# Patient Record
Sex: FEMALE | Race: WHITE
[De-identification: names, ages, dates, MRNs, and addresses within clinical notes are randomized per-mention and may not be internally consistent; named-entity substitution may affect disease eponyms.]

---

## 2018-06-11 ENCOUNTER — CHARTING TRANS (OUTPATIENT)
Dept: OTHER | Age: 63
End: 2018-06-11

## 2018-11-01 VITALS
BODY MASS INDEX: 21.2 KG/M2 | HEIGHT: 60 IN | TEMPERATURE: 99 F | OXYGEN SATURATION: 98 % | WEIGHT: 108 LBS | RESPIRATION RATE: 16 BRPM | HEART RATE: 72 BPM

## 2020-05-06 ENCOUNTER — HOSPITAL ENCOUNTER (OUTPATIENT)
Dept: CT IMAGING | Facility: HOSPITAL | Age: 65
Discharge: HOME | End: 2020-05-06
Attending: INTERNAL MEDICINE

## 2020-05-06 ENCOUNTER — APPOINTMENT (OUTPATIENT)
Dept: CARDIOLOGY CLINIC | Facility: HOSPITAL | Age: 65
End: 2020-05-06
Attending: INTERNAL MEDICINE

## 2020-05-06 DIAGNOSIS — Z13.9 ENCOUNTER FOR SCREENING: ICD-10-CM

## 2020-05-27 ENCOUNTER — TELEPHONE (OUTPATIENT)
Dept: OTHER | Facility: HOSPITAL | Age: 65
End: 2020-05-27

## 2020-05-27 NOTE — PROGRESS NOTES
Attempted to call Pt regarding cardiac incidental finding of plaque in the thoracic aorta unable to leave a message on preferred number.

## 2020-06-04 ENCOUNTER — TELEPHONE (OUTPATIENT)
Dept: OTHER | Facility: HOSPITAL | Age: 65
End: 2020-06-04

## 2020-06-04 NOTE — PROGRESS NOTES
Pt given follow call for incidental finding of plaque in thoracic aorta and has appoint with cardiology for f/u questions answered

## 2020-06-05 ENCOUNTER — OFFICE VISIT (OUTPATIENT)
Dept: CARDIOLOGY | Age: 65
End: 2020-06-05

## 2020-06-05 VITALS
DIASTOLIC BLOOD PRESSURE: 76 MMHG | BODY MASS INDEX: 21.2 KG/M2 | HEART RATE: 63 BPM | HEIGHT: 60 IN | WEIGHT: 108 LBS | SYSTOLIC BLOOD PRESSURE: 128 MMHG

## 2020-06-05 DIAGNOSIS — I70.0 CALCIFICATION OF AORTA (CMD): Primary | ICD-10-CM

## 2020-06-05 DIAGNOSIS — I35.9 AORTIC VALVE CALCIFICATION: ICD-10-CM

## 2020-06-05 PROCEDURE — 99443 TELEPHONE E&M BY PHYSICIAN EST PT NOT ORIG PREV 7 DAYS 21-30 MIN: CPT | Performed by: INTERNAL MEDICINE

## 2020-06-05 RX ORDER — LISINOPRIL AND HYDROCHLOROTHIAZIDE 12.5; 1 MG/1; MG/1
TABLET ORAL DAILY
COMMUNITY
Start: 2020-04-20

## 2020-06-05 RX ORDER — ROSUVASTATIN CALCIUM 10 MG/1
TABLET, COATED ORAL DAILY
COMMUNITY
Start: 2020-05-26

## 2020-06-05 RX ORDER — CITALOPRAM 20 MG/1
TABLET ORAL DAILY
COMMUNITY

## 2020-06-05 RX ORDER — LEVOTHYROXINE SODIUM 0.05 MG/1
TABLET ORAL DAILY
COMMUNITY
Start: 2019-12-11

## 2020-06-05 RX ORDER — ALPRAZOLAM 0.25 MG/1
0.25 TABLET ORAL PRN
COMMUNITY
Start: 2020-05-22

## 2020-06-05 SDOH — HEALTH STABILITY: PHYSICAL HEALTH: ON AVERAGE, HOW MANY DAYS PER WEEK DO YOU ENGAGE IN MODERATE TO STRENUOUS EXERCISE (LIKE A BRISK WALK)?: 7 DAYS

## 2020-06-05 ASSESSMENT — ENCOUNTER SYMPTOMS
ALLERGIC/IMMUNOLOGIC COMMENTS: NO NEW FOOD ALLERGIES
SUSPICIOUS LESIONS: 0
COUGH: 0
CHILLS: 0
HEMOPTYSIS: 0
WEIGHT GAIN: 0
HEMATOCHEZIA: 0
WEIGHT LOSS: 0
FEVER: 0
BRUISES/BLEEDS EASILY: 0

## 2020-06-05 ASSESSMENT — PATIENT HEALTH QUESTIONNAIRE - PHQ9
2. FEELING DOWN, DEPRESSED OR HOPELESS: NOT AT ALL
SUM OF ALL RESPONSES TO PHQ9 QUESTIONS 1 AND 2: 0
CLINICAL INTERPRETATION OF PHQ9 SCORE: NO FURTHER SCREENING NEEDED
1. LITTLE INTEREST OR PLEASURE IN DOING THINGS: NOT AT ALL
SUM OF ALL RESPONSES TO PHQ9 QUESTIONS 1 AND 2: 0
CLINICAL INTERPRETATION OF PHQ2 SCORE: NO FURTHER SCREENING NEEDED

## 2020-07-06 ENCOUNTER — HOSPITAL ENCOUNTER (OUTPATIENT)
Dept: CARDIOLOGY CLINIC | Facility: HOSPITAL | Age: 65
Discharge: HOME | End: 2020-07-06
Attending: INTERNAL MEDICINE

## 2020-07-06 ENCOUNTER — TELEPHONE (OUTPATIENT)
Dept: CARDIOLOGY | Age: 65
End: 2020-07-06

## 2020-07-06 DIAGNOSIS — R94.39 ABNORMAL CAROTID DUPLEX SCAN: ICD-10-CM

## 2020-07-06 DIAGNOSIS — Z13.9 ENCOUNTER FOR SCREENING: ICD-10-CM

## 2020-07-06 DIAGNOSIS — I70.0 CALCIFICATION OF AORTA (CMD): Primary | ICD-10-CM

## 2021-01-11 ENCOUNTER — HOSPITAL ENCOUNTER (OUTPATIENT)
Dept: CARDIOLOGY CLINIC | Facility: HOSPITAL | Age: 66
Discharge: HOME | End: 2021-01-11
Attending: INTERNAL MEDICINE
Payer: MEDICARE

## 2021-01-11 DIAGNOSIS — I70.0 CALCIFICATION OF AORTA (HCC): ICD-10-CM

## 2021-01-11 PROCEDURE — 93880 EXTRACRANIAL BILAT STUDY: CPT | Performed by: INTERNAL MEDICINE

## 2021-01-12 DIAGNOSIS — I70.0 CALCIFICATION OF AORTA (CMD): ICD-10-CM

## 2021-01-13 ENCOUNTER — TELEPHONE (OUTPATIENT)
Dept: CARDIOLOGY | Age: 66
End: 2021-01-13

## 2021-06-04 ENCOUNTER — APPOINTMENT (OUTPATIENT)
Dept: CARDIOLOGY | Age: 66
End: 2021-06-04

## 2021-06-07 ENCOUNTER — HOSPITAL ENCOUNTER (OUTPATIENT)
Dept: CV DIAGNOSTICS | Facility: HOSPITAL | Age: 66
Discharge: HOME | End: 2021-06-07
Attending: INTERNAL MEDICINE
Payer: MEDICARE

## 2021-06-07 DIAGNOSIS — I35.9 AORTIC VALVE CALCIFICATION: ICD-10-CM

## 2021-06-07 PROCEDURE — 93306 TTE W/DOPPLER COMPLETE: CPT | Performed by: INTERNAL MEDICINE

## 2021-06-08 DIAGNOSIS — I35.9 AORTIC VALVE CALCIFICATION: ICD-10-CM

## 2021-06-18 ENCOUNTER — OFFICE VISIT (OUTPATIENT)
Dept: CARDIOLOGY | Age: 66
End: 2021-06-18

## 2021-06-18 VITALS
HEIGHT: 60 IN | WEIGHT: 111 LBS | HEART RATE: 72 BPM | BODY MASS INDEX: 21.79 KG/M2 | SYSTOLIC BLOOD PRESSURE: 124 MMHG | DIASTOLIC BLOOD PRESSURE: 72 MMHG

## 2021-06-18 DIAGNOSIS — I70.0 CALCIFICATION OF AORTA (CMD): ICD-10-CM

## 2021-06-18 DIAGNOSIS — I35.9 AORTIC VALVE CALCIFICATION: Primary | ICD-10-CM

## 2021-06-18 PROCEDURE — 99213 OFFICE O/P EST LOW 20 MIN: CPT | Performed by: INTERNAL MEDICINE

## 2021-06-18 ASSESSMENT — PATIENT HEALTH QUESTIONNAIRE - PHQ9
1. LITTLE INTEREST OR PLEASURE IN DOING THINGS: NOT AT ALL
CLINICAL INTERPRETATION OF PHQ2 SCORE: NO FURTHER SCREENING NEEDED
2. FEELING DOWN, DEPRESSED OR HOPELESS: NOT AT ALL
SUM OF ALL RESPONSES TO PHQ9 QUESTIONS 1 AND 2: 0
SUM OF ALL RESPONSES TO PHQ9 QUESTIONS 1 AND 2: 0
CLINICAL INTERPRETATION OF PHQ9 SCORE: NO FURTHER SCREENING NEEDED

## 2021-06-18 ASSESSMENT — ENCOUNTER SYMPTOMS
SUSPICIOUS LESIONS: 0
FEVER: 0
HEMATOCHEZIA: 0
COUGH: 0
CHILLS: 0
WEIGHT LOSS: 0
WEIGHT GAIN: 0
BRUISES/BLEEDS EASILY: 0
ALLERGIC/IMMUNOLOGIC COMMENTS: NO NEW FOOD ALLERGIES
HEMOPTYSIS: 0